# Patient Record
Sex: FEMALE | Race: BLACK OR AFRICAN AMERICAN
[De-identification: names, ages, dates, MRNs, and addresses within clinical notes are randomized per-mention and may not be internally consistent; named-entity substitution may affect disease eponyms.]

---

## 2017-06-02 PROBLEM — Z00.00 ENCOUNTER FOR PREVENTIVE HEALTH EXAMINATION: Status: ACTIVE | Noted: 2017-06-02

## 2017-06-12 ENCOUNTER — RECORD ABSTRACTING (OUTPATIENT)
Age: 42
End: 2017-06-12

## 2017-06-13 ENCOUNTER — NON-APPOINTMENT (OUTPATIENT)
Age: 42
End: 2017-06-13

## 2017-06-13 ENCOUNTER — APPOINTMENT (OUTPATIENT)
Dept: CARDIOLOGY | Facility: CLINIC | Age: 42
End: 2017-06-13

## 2017-06-13 VITALS
HEIGHT: 62 IN | DIASTOLIC BLOOD PRESSURE: 88 MMHG | BODY MASS INDEX: 38.09 KG/M2 | WEIGHT: 207 LBS | OXYGEN SATURATION: 99 % | HEART RATE: 99 BPM | SYSTOLIC BLOOD PRESSURE: 133 MMHG

## 2017-06-13 DIAGNOSIS — I44.7 LEFT BUNDLE-BRANCH BLOCK, UNSPECIFIED: ICD-10-CM

## 2017-06-13 DIAGNOSIS — R94.31 ABNORMAL ELECTROCARDIOGRAM [ECG] [EKG]: ICD-10-CM

## 2017-06-13 DIAGNOSIS — I10 ESSENTIAL (PRIMARY) HYPERTENSION: ICD-10-CM

## 2017-06-13 DIAGNOSIS — Z78.9 OTHER SPECIFIED HEALTH STATUS: ICD-10-CM

## 2017-06-13 RX ORDER — PHENTERMINE HYDROCHLORIDE 37.5 MG/1
37.5 CAPSULE ORAL
Refills: 0 | Status: ACTIVE | COMMUNITY
Start: 2017-06-13

## 2017-06-13 RX ORDER — HYDROCHLOROTHIAZIDE 25 MG/1
25 TABLET ORAL
Qty: 90 | Refills: 0 | Status: ACTIVE | COMMUNITY
Start: 2017-05-26

## 2017-06-14 PROBLEM — R94.31 EKG, ABNORMAL: Status: ACTIVE | Noted: 2017-06-12

## 2017-06-14 PROBLEM — I10 HTN (HYPERTENSION): Status: ACTIVE | Noted: 2017-06-12

## 2017-06-14 PROBLEM — I44.7 LEFT BUNDLE BRANCH BLOCK: Status: ACTIVE | Noted: 2017-06-14

## 2017-06-20 ENCOUNTER — APPOINTMENT (OUTPATIENT)
Dept: CV DIAGNOSITCS | Facility: HOSPITAL | Age: 42
End: 2017-06-20

## 2017-06-20 ENCOUNTER — OUTPATIENT (OUTPATIENT)
Dept: OUTPATIENT SERVICES | Facility: HOSPITAL | Age: 42
LOS: 1 days | End: 2017-06-20
Payer: COMMERCIAL

## 2017-06-20 DIAGNOSIS — R94.31 ABNORMAL ELECTROCARDIOGRAM [ECG] [EKG]: ICD-10-CM

## 2017-06-20 DIAGNOSIS — I10 ESSENTIAL (PRIMARY) HYPERTENSION: ICD-10-CM

## 2017-06-20 PROCEDURE — 93306 TTE W/DOPPLER COMPLETE: CPT | Mod: 26

## 2018-07-26 PROBLEM — Z78.9 ALCOHOL USE: Status: ACTIVE | Noted: 2017-06-13

## 2020-10-08 ENCOUNTER — RESULT REVIEW (OUTPATIENT)
Age: 45
End: 2020-10-08

## 2024-09-26 ENCOUNTER — EMERGENCY (EMERGENCY)
Facility: HOSPITAL | Age: 49
LOS: 1 days | Discharge: HOME | End: 2024-09-26
Attending: STUDENT IN AN ORGANIZED HEALTH CARE EDUCATION/TRAINING PROGRAM
Payer: COMMERCIAL

## 2024-09-26 VITALS
RESPIRATION RATE: 16 BRPM | HEIGHT: 62 IN | DIASTOLIC BLOOD PRESSURE: 96 MMHG | SYSTOLIC BLOOD PRESSURE: 164 MMHG | TEMPERATURE: 99 F | WEIGHT: 179.9 LBS | HEART RATE: 91 BPM | OXYGEN SATURATION: 100 %

## 2024-09-26 LAB
APPEARANCE UR: CLEAR — SIGNIFICANT CHANGE UP
BASOPHILS # BLD AUTO: 0.05 K/UL — SIGNIFICANT CHANGE UP (ref 0–0.2)
BASOPHILS NFR BLD AUTO: 0.9 % — SIGNIFICANT CHANGE UP (ref 0–2)
BILIRUB UR-MCNC: NEGATIVE — SIGNIFICANT CHANGE UP
COLOR SPEC: YELLOW — SIGNIFICANT CHANGE UP
DIFF PNL FLD: NEGATIVE — SIGNIFICANT CHANGE UP
EOSINOPHIL # BLD AUTO: 0.09 K/UL — SIGNIFICANT CHANGE UP (ref 0–0.5)
EOSINOPHIL NFR BLD AUTO: 1.5 % — SIGNIFICANT CHANGE UP (ref 0–6)
GLUCOSE UR QL: NEGATIVE MG/DL — SIGNIFICANT CHANGE UP
HCT VFR BLD CALC: 35.3 % — SIGNIFICANT CHANGE UP (ref 34.5–45)
HGB BLD-MCNC: 11.3 G/DL — LOW (ref 11.5–15.5)
IMM GRANULOCYTES NFR BLD AUTO: 0.2 % — SIGNIFICANT CHANGE UP (ref 0–0.9)
KETONES UR-MCNC: ABNORMAL MG/DL
LEUKOCYTE ESTERASE UR-ACNC: NEGATIVE — SIGNIFICANT CHANGE UP
LYMPHOCYTES # BLD AUTO: 2.1 K/UL — SIGNIFICANT CHANGE UP (ref 1–3.3)
LYMPHOCYTES # BLD AUTO: 36.1 % — SIGNIFICANT CHANGE UP (ref 13–44)
MCHC RBC-ENTMCNC: 29.6 PG — SIGNIFICANT CHANGE UP (ref 27–34)
MCHC RBC-ENTMCNC: 32 GM/DL — SIGNIFICANT CHANGE UP (ref 32–36)
MCV RBC AUTO: 92.4 FL — SIGNIFICANT CHANGE UP (ref 80–100)
MONOCYTES # BLD AUTO: 0.36 K/UL — SIGNIFICANT CHANGE UP (ref 0–0.9)
MONOCYTES NFR BLD AUTO: 6.2 % — SIGNIFICANT CHANGE UP (ref 2–14)
NEUTROPHILS # BLD AUTO: 3.21 K/UL — SIGNIFICANT CHANGE UP (ref 1.8–7.4)
NEUTROPHILS NFR BLD AUTO: 55.1 % — SIGNIFICANT CHANGE UP (ref 43–77)
NITRITE UR-MCNC: NEGATIVE — SIGNIFICANT CHANGE UP
NRBC # BLD: 0 /100 WBCS — SIGNIFICANT CHANGE UP (ref 0–0)
PH UR: 5.5 — SIGNIFICANT CHANGE UP (ref 5–8)
PLATELET # BLD AUTO: 233 K/UL — SIGNIFICANT CHANGE UP (ref 150–400)
PROT UR-MCNC: NEGATIVE MG/DL — SIGNIFICANT CHANGE UP
RBC # BLD: 3.82 M/UL — SIGNIFICANT CHANGE UP (ref 3.8–5.2)
RBC # FLD: 13.4 % — SIGNIFICANT CHANGE UP (ref 10.3–14.5)
SP GR SPEC: 1.01 — SIGNIFICANT CHANGE UP (ref 1–1.03)
UROBILINOGEN FLD QL: 0.2 MG/DL — SIGNIFICANT CHANGE UP (ref 0.2–1)
WBC # BLD: 5.82 K/UL — SIGNIFICANT CHANGE UP (ref 3.8–10.5)
WBC # FLD AUTO: 5.82 K/UL — SIGNIFICANT CHANGE UP (ref 3.8–10.5)

## 2024-09-26 RX ADMIN — Medication 4 MILLIGRAM(S): at 23:17

## 2024-09-26 NOTE — ED PROVIDER NOTE - NSFOLLOWUPCLINICS_GEN_ALL_ED_FT
[facility] Orthopedic Surgery  Orthopedic Surgery  [address], 3rd & 4th [place] [address]  Phone: [phone]  Fax:     Gastroenterology at [facility]  Gastroenterology  [address] [place] [address]  Phone: [phone]  Fax:

## 2024-09-26 NOTE — CONSULT NOTE ADULT - SUBJECTIVE AND OBJECTIVE BOX
BARIATRIC SURGERY CONSULT NOTE  --------------------------------------------------------------------------------------------  HPI:  49F with PMH of HTN and PSHx of gastric sleeve in  with Dr. Welch ([facility]) presenting with abdominal pain and discomfort for 4 days. Reports it is on the right side of her abdomen, feels like a "balloon is inside" with a throbbing sensation. Has not had this pain before. Reports she still follows with her bariatric surgeon, last saw last year without any issues noted. Has a follow-up appointment schedule for November. Reports she has not gotten an upper endoscopy and colonoscopy since before her sleeve surgery, but still follows with a Gastroenterologist. She used to take a PPI for 2 months right after her sleeve surgery, however was told by her surgeon she doesn't need it anymore and stopped taking it. Weight loss of 80lbs with regain of 30lbs since sleeve. Denies fever, chills, nausea, emesis, having normal bowel function.    In ED, patient is afebrile, HR 91, hypertensive to systolic 160s. Labs pending. UA negative.    PAST MEDICAL & SURGICAL HISTORY:    FAMILY HISTORY:  [] Family history not pertinent as reviewed with the patient and family    SOCIAL HISTORY:  ***    ALLERGIES: No Known Allergies      HOME MEDICATIONS: ***    CURRENT MEDICATIONS  MEDICATIONS (STANDING): morphine  - Injectable 4 milliGRAM(s) IV Push Once  sodium chloride 0.9% 1000 milliLiter(s) IV Continuous <Continuous>    MEDICATIONS (PRN):  --------------------------------------------------------------------------------------------    Vitals:   T(C): 37.2 (24 @ 18:19), Max: 37.2 (24 @ 18:19)  HR: 91 (24 @ 18:19) (91 - 91)  BP: 164/96 (24 @ 18:19) (164/96 - 164/96)  RR: 16 (24 @ 18:19) (16 - 16)  SpO2: 100% (24 @ 18:19) (100% - 100%)  CAPILLARY BLOOD GLUCOSE          Height (cm): 157.5 ( 18:19)  Weight (kg): 81.6 ( 18:19)  BMI (kg/m2): 32.9 (:19)  BSA (m2): 1.83 (:)      PHYSICAL EXAM:  General: NAD, Lying in bed comfortably  Neuro: Alert and answering questions appropriately   HEENT: Grossly normal  Cardio: No peripheral edema  Resp: Good effort, no signs of respiratory distress  GI/Abd: Soft, tender in RUQ and R mid abdomen  Vascular: All 4 extremities warm  Musculoskeletal: All 4 extremities moving spontaneously, no limitations  --------------------------------------------------------------------------------------------    LABS                --------------------------------------------------------------------------------------------    MICROBIOLOGY  Urinalysis ( @ 21:59):     Color: Yellow / Appearance: Clear / S.014 / pH: 5.5 / Gluc: Negative / Ketones: Trace[!] / Bili: Negative / Urobili: 0.2 / Protein :Negative / Nitrites: Negative / Leuk.Est: Negative / RBC:  / WBC:  / Sq Epi:  / Non Sq Epi:  / Bacteria          --------------------------------------------------------------------------------------------

## 2024-09-26 NOTE — ED PROVIDER NOTE - PHYSICAL EXAMINATION
Const: no acute distress, Well-developed, Eyes: no conjunctival injection and no scleral icterus ENMT: Moist mucus membranes, CVS: +S1/S2, radial pulse 2+ bilaterally  RESP: Unlabored respiratory effort, Clear to auscultation bilaterally GI: mild ruq tenderness nondistended soft abdomen, no CVA tenderness MSK: Extremities w/o deformity or ttp, Psych: Awake, Alert, & Orientedx3;  Appropriate mood and affect, cooperative

## 2024-09-26 NOTE — ED PROVIDER NOTE - ATTENDING CONTRIBUTION TO CARE
hx of HTN and gastric sleeve in 2021, by Dr. Welch, reports 5 days of abd pain, worse when eating spicy food and relieved by nothing and reports it is worse w/ deep inspiration, pt w/ soft abd nontoxic appearing, pt w/ no cva tenderness, will have labs likely gastritis vs gerd vs possible gastric sleeve compmlication, plan for ct scan, and bariatric consultation, pt reports compliance w/ home meds hx of HTN and gastric sleeve in 2021, by Dr. Welch, reports 5 days of abd pain, worse when eating spicy food and relieved by nothing and reports it is worse w/ deep inspiration, pt w/ soft abd mild generalized tenderness in the abdomen nontoxic appearing, pt w/ no cva tenderness, will have labs likely gastritis vs gerd vs possible gastric sleeve complication, plan for ct scan, and bariatric consultation, pt reports compliance w/ home meds

## 2024-09-26 NOTE — ED PROVIDER NOTE - CLINICAL SUMMARY MEDICAL DECISION MAKING FREE TEXT BOX
49 yr f with past medical history of HTN, heart murmur and gastric sleeve 2021, presents due to abdominal pain. Started 5 days ago, worse over the past 24 hours, worse with eating a burrito and spicy food and when she takes a deep breath. States it is a 10/10 when happening right now it is 5/10. Denies nausea, vomiting fever, chills chest pain shortness of breath She had a bowel movement     PE: NAD mildly uncomfortable with moans and slight readjustment in the bed, MMM, lungs CTA, heart no MRG, no abdominal tenderness no distended non rigid soft, no CVA tenderness, no LE edema, LE non tender  ddx: GERD, gastritis, hiatal hernia, r/u GI perf, issue with Gastric sleeve  Plan: CBC, CMP, RUQ US, Surg consult, CT AP w con, thiamine, folic, mg   I think patient is having GERD due the fact that it is worse with spicy food, I have lower suspicion for acute pathology due to her NAD presentation despite moderate discomfort, CT with help r.u acute conditions, symptoms control until imaging

## 2024-09-26 NOTE — ED PROVIDER NOTE - OBJECTIVE STATEMENT
49 yr f with past medical history of HTN, heart murmur and gastric sleeve 2021, presents due to abdominal pain. Started 5 days ago, worse over the past 24 hours, worse with eating a burrito and spicy food and when she takes a deep breath. States it is a 10/10 when happening right now it is (INCOMPLETE) 49 yr f with past medical history of HTN, heart murmur and gastric sleeve 2021, presents due to abdominal pain. Started 5 days ago, worse over the past 24 hours, worse with eating a burrito and spicy food and when she takes a deep breath. States it is a 10/10 when happening right now it is 5/10. Denies nausea, vomiting fever, chills chest pain shortness of breath 49 yr f with past medical history of HTN, heart murmur and gastric sleeve 2021, presents due to abdominal pain. Started 5 days ago, worse over the past 24 hours, worse with eating a burrito and spicy food and when she takes a deep breath. States it is a 10/10 when happening right now it is 5/10. Denies nausea, vomiting fever, chills chest pain shortness of breath She had a bowel movement

## 2024-09-26 NOTE — ED ADULT TRIAGE NOTE - ARRIVAL FROM
Detail Level: Detailed
Add 98053 Cpt? (Important Note: In 2017 The Use Of 14351 Is Being Tracked By Cms To Determine Future Global Period Reimbursement For Global Periods): no
Home

## 2024-09-26 NOTE — CONSULT NOTE ADULT - ASSESSMENT
49F with PMH of HTN and PSHx of gastric sleeve in 2021 with Dr. Welch ([facility]) presenting with abdominal pain and discomfort for 4 days, located on the right side of her abdomen. Has not taken PPI since 2 months after her sleeve surgery and no upper endoscopy or colonoscopy since before surgery however still follows with her bariatric surgeon and GI.    RECS:  - CTAP with PO and IV, can go to CT scan right after drinking contrast  - RUQ US for RUQ tenderness  - Banana bag stat in ED  - Labs  - Dispo pending    [facility] [phone]   49F with PMH of HTN and PSHx of gastric sleeve in 2021 with Dr. Welch ([facility]) presenting with abdominal pain and discomfort for 4 days, located on the right side of her abdomen. Has not taken PPI since 2 months after her sleeve surgery and no upper endoscopy or colonoscopy since before surgery however still follows with her bariatric surgeon and GI.    RECS:  - CTAP with PO and IV reviewed, no acute pathology  - RUQ US reviewed, no acute pathology  - PO challenge, dispo per ED  - Pt should follow-up outpatient with her bariatric surgeon and GI physician  - Recommend restarting PPI    Discussed with fellow on behalf of attending    [facility] [phone]

## 2024-09-26 NOTE — ED ADULT NURSE NOTE - OBJECTIVE STATEMENT
49 y.o F A&0x4 ambulatory w.o assist PMH of HTN and gastric sleeve in 2021, by Dr. Welch, reports 5 days of abd pain, worse when eating spicy food and relieved by nothing and reports it is worse w/ deep inspiration, pt w/ soft abd nontoxic appearing, pt w/ no cva tenderness, will have labs likely gastritis vs gerd vs possible gastric sleeve compmlication, plan for ct scan, and bariatric consultation, pt reports compliance w/ home meds.

## 2024-09-26 NOTE — ED PROVIDER NOTE - PATIENT PORTAL LINK FT
You can access the FollowMyHealth Patient Portal offered by [facility] by registering at the following website: [email or web address]. By joining [employer]’s FollowMyHealth portal, you will also be able to view your health information using other applications (apps) compatible with our system.

## 2024-09-26 NOTE — ED ADULT NURSE NOTE - NSFALLUNIVINTERV_ED_ALL_ED
Bed/Stretcher in lowest position, wheels locked, appropriate side rails in place/Call bell, personal items and telephone in reach/Instruct patient to call for assistance before getting out of bed/chair/stretcher/Non-slip footwear applied when patient is off stretcher/[place] to call system/Physically safe environment - no spills, clutter or unnecessary equipment/Purposeful proactive rounding/Room/bathroom lighting operational, light cord in reach

## 2024-09-27 VITALS
HEART RATE: 77 BPM | RESPIRATION RATE: 17 BRPM | SYSTOLIC BLOOD PRESSURE: 130 MMHG | TEMPERATURE: 98 F | OXYGEN SATURATION: 100 % | DIASTOLIC BLOOD PRESSURE: 80 MMHG

## 2024-09-27 LAB
ALBUMIN SERPL ELPH-MCNC: 3.9 G/DL — SIGNIFICANT CHANGE UP (ref 3.3–5)
ALP SERPL-CCNC: 58 U/L — SIGNIFICANT CHANGE UP (ref 40–120)
ALT FLD-CCNC: 11 U/L — SIGNIFICANT CHANGE UP (ref 10–45)
ANION GAP SERPL CALC-SCNC: 12 MMOL/L — SIGNIFICANT CHANGE UP (ref 5–17)
AST SERPL-CCNC: 16 U/L — SIGNIFICANT CHANGE UP (ref 10–40)
BILIRUB SERPL-MCNC: 0.4 MG/DL — SIGNIFICANT CHANGE UP (ref 0.2–1.2)
BUN SERPL-MCNC: 12 MG/DL — SIGNIFICANT CHANGE UP (ref 7–23)
CALCIUM SERPL-MCNC: 9.3 MG/DL — SIGNIFICANT CHANGE UP (ref 8.4–10.5)
CHLORIDE SERPL-SCNC: 98 MMOL/L — SIGNIFICANT CHANGE UP (ref 96–108)
CO2 SERPL-SCNC: 24 MMOL/L — SIGNIFICANT CHANGE UP (ref 22–31)
CREAT SERPL-MCNC: 0.89 MG/DL — SIGNIFICANT CHANGE UP (ref 0.5–1.3)
D DIMER BLD IA.RAPID-MCNC: <150 NG/ML DDU — SIGNIFICANT CHANGE UP
EGFR: 78 ML/MIN/1.73M2 — SIGNIFICANT CHANGE UP
GLUCOSE SERPL-MCNC: 103 MG/DL — HIGH (ref 70–99)
HCG SERPL-ACNC: <2 MIU/ML — SIGNIFICANT CHANGE UP
LIDOCAIN IGE QN: 18 U/L — SIGNIFICANT CHANGE UP (ref 7–60)
POTASSIUM SERPL-MCNC: 3.3 MMOL/L — LOW (ref 3.5–5.3)
POTASSIUM SERPL-SCNC: 3.3 MMOL/L — LOW (ref 3.5–5.3)
PROT SERPL-MCNC: 7.4 G/DL — SIGNIFICANT CHANGE UP (ref 6–8.3)
SODIUM SERPL-SCNC: 134 MMOL/L — LOW (ref 135–145)

## 2024-09-27 RX ORDER — THIAMINE HCL 250 MG
100 TABLET ORAL ONCE
Refills: 0 | Status: DISCONTINUED | OUTPATIENT
Start: 2024-09-27 | End: 2024-09-27

## 2024-09-27 RX ORDER — FOLIC ACID 1 MG
1 TABLET ORAL ONCE
Refills: 0 | Status: COMPLETED | OUTPATIENT
Start: 2024-09-27 | End: 2024-09-27

## 2024-09-27 RX ORDER — PANTOPRAZOLE SODIUM 40 MG
1 TABLET, DELAYED RELEASE (ENTERIC COATED) ORAL
Qty: 10 | Refills: 0
Start: 2024-09-27 | End: 2024-10-06

## 2024-09-27 RX ORDER — POTASSIUM CHLORIDE 10 MEQ
40 TABLET, EXT RELEASE, PARTICLES/CRYSTALS ORAL ONCE
Refills: 0 | Status: COMPLETED | OUTPATIENT
Start: 2024-09-27 | End: 2024-09-27

## 2024-09-27 RX ORDER — DEXTROSE, SODIUM ACETATE, POTASSIUM CHLORIDE, POTASSIUM PHOSPHATE, AND SODIUM CHLORIDE 5; .15; .13; .28; .091 G/100ML; G/100ML; G/100ML; G/100ML; G/100ML
1000 INJECTION, SOLUTION INTRAVENOUS
Refills: 0 | Status: DISCONTINUED | OUTPATIENT
Start: 2024-09-27 | End: 2024-09-30

## 2024-09-27 RX ORDER — THIAMINE HCL 250 MG
100 TABLET ORAL ONCE
Refills: 0 | Status: COMPLETED | OUTPATIENT
Start: 2024-09-27 | End: 2024-09-27

## 2024-09-27 RX ADMIN — DEXTROSE, SODIUM ACETATE, POTASSIUM CHLORIDE, POTASSIUM PHOSPHATE, AND SODIUM CHLORIDE 250 MILLILITER(S): 5; .15; .13; .28; .091 INJECTION, SOLUTION INTRAVENOUS at 01:11

## 2024-09-27 RX ADMIN — Medication 4 MILLIGRAM(S): at 00:41

## 2024-09-27 RX ADMIN — Medication 40 MILLIEQUIVALENT(S): at 06:04

## 2024-09-27 RX ADMIN — Medication 4 MILLIGRAM(S): at 00:47

## 2024-09-27 RX ADMIN — Medication 4 MILLIGRAM(S): at 01:45

## 2024-09-27 RX ADMIN — Medication 100 MILLIGRAM(S): at 00:47

## 2024-09-27 RX ADMIN — Medication 1 MILLIGRAM(S): at 01:11

## 2024-09-27 NOTE — PROGRESS NOTE ADULT - SUBJECTIVE AND OBJECTIVE BOX
Surgery Progress Note     BILL LIZAMA  [id number]  [facility] ED    Interval/Subjective:  Patient seen and examined bedside, resting comfortable. no acute events overnight.   __________________________________________________________________  Vitals:  T(C): 37 (00:21), Max: 37.2 (18:19)  HR: 78 (00:21) (78 - 91)  BP: 145/82 (00:21) (145/82 - 164/96)  RR: 17 (00:21) (16 - 17)  SpO2: 100% (00:21) (100% - 100%)      PHYSICAL EXAM:  General: NAD, Lying in bed comfortably  Neuro: Alert and answering questions appropriately   HEENT: Grossly normal  Cardio: No peripheral edema  Resp: Good effort, no signs of respiratory distress  GI/Abd: Soft, tender in RUQ and R mid abdomen  Vascular: All 4 extremities warm  Musculoskeletal: All 4 extremities moving spontaneously, no limitations    Labs:  CBC: 09-26-24 @ 23:40          11.3   5.82 >----< 233          35.3    Chemistry: 09-26-24 @ 23:40  134|98|12    ------------< 103  3.3|24|0.89    Ca: 9.3 09-26-24 @ 23:40  Mg: -- 09-26-24 @ 23:40  Phos: -- 09-26-24 @ 23:40    LFT's: 09-26-24 @ 23:40  AST: 16  ALT: 11  ALP: 58  T.Bili: 0.4  D.Bili: --    __________________________________________________________________

## 2024-09-27 NOTE — ED POST DISCHARGE NOTE - ADDITIONAL DOCUMENTATION
9/27/24 Lay ALARCON pt called, rx for protonix granule could not be filled. will resubmit as initially intended by original prescriber. I did not evaluate this patient and I am not treating this patient, I am resubmitting already intended rx.

## 2024-09-27 NOTE — PROGRESS NOTE ADULT - ASSESSMENT
49F with PMH of HTN and PSHx of gastric sleeve in 2021 with Dr. Welch ([facility]) presenting with abdominal pain and discomfort for 4 days, located on the right side of her abdomen. Has not taken PPI since 2 months after her sleeve surgery and no upper endoscopy or colonoscopy since before surgery however still follows with her bariatric surgeon and GI.    RECS:  - CTAP with PO and IV, can go to CT scan right after drinking contrast  - RUQ US for RUQ tenderness  - Banana bag stat in ED  - Labs  - Dispo pending    [facility] [phone]   49F with PMH of HTN and PSHx of gastric sleeve in 2021 with Dr. Welch ([facility]) presenting with abdominal pain and discomfort for 4 days, located on the right side of her abdomen. Has not taken PPI since 2 months after her sleeve surgery and no upper endoscopy or colonoscopy since before surgery however still follows with her bariatric surgeon and GI.    RECS:  - CTAP with PO and IV reviewed, no acute pathology  - RUQ US reviewed, no acute pathology  - PO challenge, dispo per ED  - Pt should follow-up outpatient with her bariatric surgeon and GI physician  - Recommend restarting PPI    Discussed with fellow on behalf of attending    [facility] [phone]